# Patient Record
Sex: FEMALE | Race: WHITE | NOT HISPANIC OR LATINO | Employment: UNEMPLOYED | URBAN - NONMETROPOLITAN AREA
[De-identification: names, ages, dates, MRNs, and addresses within clinical notes are randomized per-mention and may not be internally consistent; named-entity substitution may affect disease eponyms.]

---

## 2018-02-07 ENCOUNTER — OFFICE VISIT (OUTPATIENT)
Dept: URGENT CARE | Facility: PHYSICIAN GROUP | Age: 48
End: 2018-02-07

## 2018-02-07 VITALS
DIASTOLIC BLOOD PRESSURE: 86 MMHG | TEMPERATURE: 98.3 F | WEIGHT: 178 LBS | BODY MASS INDEX: 28.61 KG/M2 | OXYGEN SATURATION: 97 % | HEIGHT: 66 IN | HEART RATE: 112 BPM | RESPIRATION RATE: 14 BRPM | SYSTOLIC BLOOD PRESSURE: 120 MMHG

## 2018-02-07 DIAGNOSIS — M25.512 CHRONIC LEFT SHOULDER PAIN: ICD-10-CM

## 2018-02-07 DIAGNOSIS — G89.29 CHRONIC LEFT SHOULDER PAIN: ICD-10-CM

## 2018-02-07 PROCEDURE — 99214 OFFICE O/P EST MOD 30 MIN: CPT | Performed by: PHYSICIAN ASSISTANT

## 2018-02-07 RX ORDER — METHYLPREDNISOLONE 4 MG/1
TABLET ORAL
Qty: 21 TAB | Refills: 0 | Status: SHIPPED | OUTPATIENT
Start: 2018-02-07

## 2018-02-07 ASSESSMENT — ENCOUNTER SYMPTOMS
RESPIRATORY NEGATIVE: 1
CARDIOVASCULAR NEGATIVE: 1
NEUROLOGICAL NEGATIVE: 1
CONSTITUTIONAL NEGATIVE: 1

## 2018-02-07 NOTE — LETTER
[facility]  [facility] [address] [place] [address]     February 7, 2018    Patient: Helene Lara   YOB: 1970   Date of Visit: 2/7/2018       To Whom It May Concern:    Helene Lara was seen and treated in our department on 2/7/2018. Please excuse any absences. May return back to work  on 02/10/2018.    Sincerely,     Michelle Khan, Med Ass't

## 2018-02-07 NOTE — PROGRESS NOTES
"Subjective:      Helene Lara is a 48 y.o. female who presents with Arm Pain (X 3 years )        Arm Pain      Patient presents today for around 3 years of left shoulder/upper arm pain.   It worsened after Saturday, possibly strained it when moving pallets at her house.  She notes some movements, throwing things overhead, etc can aggravate it including some of her work duties.  Denies this being work related or originally happening at work.  The pain will be throughout the shoulder around and nursing home down the upper arm.  No numbness/tingling.  She is left handed, does deny perception of weakened .   No neck pain.   No chest pain or SOB.  Pain does not radiate down back or into shoulder blade.  No attempted interventions thus far and states she does not like taking medication.     Review of Systems   Constitutional: Negative.    Respiratory: Negative.    Cardiovascular: Negative.    Musculoskeletal:        SEE HPI   Skin: Negative.    Neurological: Negative.    Endo/Heme/Allergies: Negative.        PMH:  has a past medical history of Breast CA (CMS-HCC) (2003) and Cervical ca (CMS-HCC) (1997).  MEDS:   Current Outpatient Prescriptions:   •  MethylPREDNISolone (MEDROL DOSEPAK) 4 MG Tablet Therapy Pack, Take as directed, Disp: 21 Tab, Rfl: 0  •  cyclobenzaprine (FLEXERIL) 10 MG TABS, Take 1 Tab by mouth 3 times a day as needed for Moderate Pain., Disp: 15 Tab, Rfl: 0  ALLERGIES: No Known Allergies  SURGHX:   Past Surgical History:   Procedure Laterality Date   • HYSTERECTOMY, TOTAL ABDOMINAL     • LAPAROSCOPY     • MASTECTOMY      right     SOCHX:  reports that she has been smoking Cigarettes.  She has never used smokeless tobacco. She reports that she drinks alcohol. She reports that she does not use drugs.  FH: Family history was reviewed, no pertinent findings to report     Objective:     /86   Pulse (!) 112   Temp 36.8 °C (98.3 °F)   Resp 14   Ht 1.676 m (5' 6\")   Wt 80.7 kg (178 lb)   SpO2 97%   " BMI 28.73 kg/m²      Physical Exam   Constitutional: She is oriented to person, place, and time. She appears well-developed and well-nourished. No distress.   Neck: Normal range of motion. Neck supple.   Cardiovascular: Normal rate and regular rhythm.    Pulmonary/Chest: Effort normal and breath sounds normal.   Musculoskeletal:        Left shoulder: She exhibits decreased range of motion (pain with flexion/abduction greater than 90 degrees and with internal rotation), pain and decreased strength (5/5 strength right, 4/5 left). She exhibits no tenderness, no bony tenderness, no swelling and normal pulse.        Arms:  Lymphadenopathy:     She has cervical adenopathy.   Neurological: She is alert and oriented to person, place, and time.   Skin: Skin is warm and dry. No rash noted.   Psychiatric: She has a normal mood and affect. Her behavior is normal.   Vitals reviewed.              Assessment/Plan:     1. Chronic left shoulder pain  MethylPREDNISolone (MEDROL DOSEPAK) 4 MG Tablet Therapy Pack    REFERRAL TO SPORTS MEDICINE       -patient placed in sling and given work note to rest for up to 3 work days due to strenuous nature of her work.   -sports medicine referral placed for follow up. Patient has had these issues for 3+ years.  I discussed she may need further medication/imaging/PT or any number of these to continue to address this complaint.   -she expresses understanding of the plan.  Rotator cuff exercises also discussed and demonstrated.        Supportive care, differential diagnoses, and indications for immediate follow-up discussed with patient.   Pathogenesis of diagnosis discussed including typical length and natural progression.   Instructed to return to clinic or nearest emergency department for any change in condition, further concerns, or worsening of symptoms.  Patient states understanding of the plan of care and discharge instructions.      Margarita Baker P.A.-C.